# Patient Record
(demographics unavailable — no encounter records)

---

## 2025-03-12 NOTE — COUNSELING
[Cessation strategies including cessation program discussed] : Cessation strategies including cessation program discussed [Use of nicotine replacement therapies and other medications discussed] : Use of nicotine replacement therapies and other medications discussed [No] : Not willing to quit smoking [ - Annual Lung Cancer Screening/Share Decision Making Discussion] : Annual Lung Cancer Screening/Share Decision Making Discussion. (I have advised this patient to have a Low Dose CT (LDCT) scan of the lungs and have discussed the following with the patient in a shared decision making discussion:   Benefits of Detection and Early Treatment: There is adequate evidence that annual screening for lung cancer with LDCT in a population of high-risk persons can prevent a substantial number of lung cancer-related deaths. The magnitude of benefit depends on the individual patient's risk for lung cancer, as those who are at highest risk are most likely to benefit. Screening cannot prevent most lung cancer-related deaths, and does not replace smoking cessation. Harms of Detection and Early Intervention and Treatment: The harms associated with LDCT screening include false-negative and false-positive results, incidental findings, over diagnosis, and radiation exposure. False-positive LDCT results occur in a substantial proportion of screened persons; 95% of all positive results do not lead to a diagnosis of cancer. In a high-quality screening program, further imaging can resolve most false-positive results; however, some patients may require invasive procedures. Radiation harms, including cancer resulting from cumulative exposure to radiation, vary depending on the age at the start of screening; the number of scans received; and the person's exposure to other sources of radiation, particularly other medical imaging.) [FreeTextEntry1] : 5

## 2025-03-12 NOTE — REVIEW OF SYSTEMS
[Negative] : Neurological [Dysuria] : no dysuria [FreeTextEntry9] : As HPI [de-identified] : As HPI [de-identified] : Schizoaffective disorder

## 2025-03-12 NOTE — HISTORY OF PRESENT ILLNESS
[FreeTextEntry1] : Annual wellness exam. [de-identified] : Mr. SALINA MATHEW presents today for annual wellness exam.  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 GERD symptoms are controlled with pantoprazole he is asking refill. Type 2 diabetes follows endocrinology blood sugar is well-controlled. He continues to smoke. Follows with cardiology denied any chest pain, shortness of breath.

## 2025-03-12 NOTE — ASSESSMENT
[FreeTextEntry1] : Annual: Ordered comprehensive blood work  screen for  thyroid disorder. HIV and Hep C  screening test.deferred labs drawn in the office The results will be reviewed, and any abnormalities will be addressed accordingly.  flu vaccine:deferred tdap: utd PPSV  :utd shingles vaccine.: deferred COVID vaccine:utd   alcohol screening :SIBRT:0 Depression screening:PHQ2:0  cirrhosis/MCDONOUGH  EGD  showed esophageal varices and portal hypertension He is on Lasix and Aldactone and nadolol 40 mg twice a day He was referred to gastroenterologist/hepatologist, have not made appointment yet.  Order CMP. Advised patient to follow Mediterranean diet, portion control, exercise 150 minutes/week  CHF/atrial fibrillation Sees cardiology, reviewed notes He is currently on Lasix, Aldactone, nadolol, atorvastatin, aspirin, sotalol, and Eliquis  DM: last HgA1c 5.7 8/2021. Ordered A1c On Mounjaro 2.5 mg, glipizide extended release 5 mg once a day and metformin 1000 mg twice a day Advised low-carb diet and avoid sugar. DM neuropathy: he is on  lyrica, gabapentin, and Nucynta ER sees neurologist Dr. Kidd   Schizoaffective disorder: Under the care of psychiatry and has been following up regularly He is on risperdal and lamictal  Chronic venous stasis ulcer/cellulitis of left leg Follows with general surgery Dr. Sharif  He will continue furosemide. Recommended elevation of leg increase mobility.  GERD Symptoms are well-controlled on pantoprazole 40 mg.  Renewed prescription.  COPD/smoker: symptoms are well controlled on spiriva. He continues to smoke , counselled on cessation. Nicotine dependence: I SPEND 5 MIN ON COUNSELLING ON SMOKING CESSATION. Explained the harmful effects of smoking on various aspects of health, including the increased risk of lung cancer, heart disease, respiratory issues, and other smoking-related illnesses. Behavioral therapies: Suggested counseling or behavioral therapy programs that can complement their efforts to quit smoking. Such programs can provide support, coping strategies, and motivation to stay on track. Ordered lung cancer screening  obesity: BMI:39 Advised low carb , Mediterranean diet,avoid carbonated beverage , added sugar and sweets. exercise min 150 min/wk. portion control, maintain a food diary.   Palpitations Paroxysmal atrial fibrillation.  Sees cardiology. he is on Sotalol and Eliquis   HFpEF (EF 60-65% 05/21) He is currently asymptomatic from cardiac standpoint. On aspirin, statin, spironolactone and Lasix.

## 2025-03-12 NOTE — PHYSICAL EXAM
[Normal] : soft, non-tender, non-distended, no masses palpated, no HSM and normal bowel sounds [Normal Insight/Judgement] : insight and judgment were intact [de-identified] : Very poor hygiene, foul-smelling [de-identified] : Left lower leg is wrapped with bandage the skin looks red swollen feet is with white crust

## 2025-03-12 NOTE — HEALTH RISK ASSESSMENT
[Fair] : ~his/her~ current health as fair  [Good] : ~his/her~  mood as  good [No] : No [Little interest or pleasure doing things] : 1) Little interest or pleasure doing things [Feeling down, depressed, or hopeless] : 2) Feeling down, depressed, or hopeless [0] : 2) Feeling down, depressed, or hopeless: Not at all (0) [PHQ-2 Negative - No further assessment needed] : PHQ-2 Negative - No further assessment needed [Current] : Current [NO] : No [Patient declined colonoscopy] : Patient declined colonoscopy [HIV test declined] : HIV test declined [Hepatitis C test declined] : Hepatitis C test declined [None] : None [Smoke Detector] : smoke detector [Seat Belt] :  uses seat belt [Patient/Caregiver not ready to engage] : , patient/caregiver not ready to engage [Audit-CScore] : 0 [PIU4Occld] : 0 [Change in mental status noted] : No change in mental status noted [AdvancecareDate] : 3/25

## 2025-03-12 NOTE — HEALTH RISK ASSESSMENT
[Fair] : ~his/her~ current health as fair  [Good] : ~his/her~  mood as  good [No] : No [Little interest or pleasure doing things] : 1) Little interest or pleasure doing things [Feeling down, depressed, or hopeless] : 2) Feeling down, depressed, or hopeless [0] : 2) Feeling down, depressed, or hopeless: Not at all (0) [PHQ-2 Negative - No further assessment needed] : PHQ-2 Negative - No further assessment needed [Current] : Current [NO] : No [Patient declined colonoscopy] : Patient declined colonoscopy [HIV test declined] : HIV test declined [Hepatitis C test declined] : Hepatitis C test declined [None] : None [Smoke Detector] : smoke detector [Seat Belt] :  uses seat belt [Patient/Caregiver not ready to engage] : , patient/caregiver not ready to engage [Audit-CScore] : 0 [RMQ5Xcmyl] : 0 [Change in mental status noted] : No change in mental status noted [AdvancecareDate] : 3/25

## 2025-03-12 NOTE — HISTORY OF PRESENT ILLNESS
[FreeTextEntry1] : Annual wellness exam. [de-identified] : Mr. SALINA MATHEW presents today for annual wellness exam.  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 GERD symptoms are controlled with pantoprazole he is asking refill. Type 2 diabetes follows endocrinology blood sugar is well-controlled. He continues to smoke. Follows with cardiology denied any chest pain, shortness of breath.

## 2025-03-12 NOTE — REVIEW OF SYSTEMS
[Negative] : Neurological [Dysuria] : no dysuria [FreeTextEntry9] : As HPI [de-identified] : As HPI [de-identified] : Schizoaffective disorder

## 2025-03-12 NOTE — PHYSICAL EXAM
[Normal] : soft, non-tender, non-distended, no masses palpated, no HSM and normal bowel sounds [Normal Insight/Judgement] : insight and judgment were intact [de-identified] : Very poor hygiene, foul-smelling [de-identified] : Left lower leg is wrapped with bandage the skin looks red swollen feet is with white crust

## 2025-03-21 NOTE — HISTORY OF PRESENT ILLNESS
[Home] : at home, [unfilled] , at the time of the visit. [Telehealth (audio & video)] : This visit was provided via telehealth using real-time 2-way audio visual technology. [Verbal consent obtained from patient] : the patient, [unfilled] [FreeTextEntry1] : Follow up of DM  History of DM: Diagnosed for around 15 years  Severity:  Home regimen: Mounjaro 2.5 weekly  Glipizide 5 mg daily    Previous medications Glyburide  Metformin 1g BID   Sugar checks: once a week Hypoglycemia: has symptoms, once every 6 months  Eye doctor: Due  Neuropathy: has neuropathy Kidney disease: none Has liver cirrhosis due to fatty liver but not following with anyone   This visit:  Has no erections and retrograde ejaculation    Smoking: A pack of cigarettes  Exercise: cannot walk because of the balance    Labs: A1c 6   All other ROS reviewed, and they are negative.   Labs reviewed.

## 2025-03-21 NOTE — ASSESSMENT
[FreeTextEntry1] : Type 2 DM controlled  A1c 6.5 Off Metformin  Counseled on risk for MTC, pancreatitis, worsening gall bladder stones and worsening DR Medication to stop 2 weeks before planned surgery.  We agreed on the following plan today. Continue Glipizide 5 mg daily  Increase Mounjaro to 5 mg weekly, once you increase if sugars drop down, stop the glipizide  Continue on healthy diet. Please see an eye doctor Please see a foot doctor for the neuropathy      HLD On statin Check LDL    HTN On spironolactone and nadolol     Peripheral Neuropathy  Following with neurologist   Elevated PRL  Due psychotropic medications (Resperidone) Recheck pituitary labs Labs 8 am fasting   High Free T4 Will check esoterix FT4 first then will recheck FT4 with dialysis as needed   Low testosterone  GEMA  Obesity  Extensively counseled on weight loss strategies and exercise at least 30 minutes daily Will recheck numbers   Smoke counseling  Cut one cig every 2 weeks    I spent 30 minutes discussing with patient face to face and non face to face reviewing documentations, labs, and/or imaging, also discussing the management plans.   RTC in 3 months for 30 min as [employer]

## 2025-07-09 NOTE — REASON FOR VISIT
[Home] : at home, [unfilled] , at the time of the visit. [Medical Office: ([facility])___] : at the medical office located in  [Telehealth (audio & video)] : This visit was provided via telehealth using real-time 2-way audio visual technology. [Verbal consent obtained from patient] : the patient, [unfilled]

## 2025-07-09 NOTE — ASSESSMENT
[FreeTextEntry1] : Type 2 DM controlled  A1c 6.8 Off Metformin  Stop Glipizide  Counseled on risk for MTC, pancreatitis, worsening gall bladder stones and worsening DR Medication to stop 2 weeks before planned surgery.  We agreed on the following plan today. Stop Glipizide 5 mg daily  Increase Mounjaro to 7.5 mg weekly, Continue on healthy diet. Please see an eye doctor Please see a foot doctor for the neuropathy      HLD On statin Check LDL    HTN On spironolactone and nadolol   Peripheral Neuropathy  Following with neurologist   Elevated PRL  Due psychotropic medications (Resperidone) PRL is 19  Recheck pituitary labs Labs 8 am fasting   High Free T4 Will check esoterix FT4 first then will recheck FT4 with dialysis as needed   Low testosterone  GEMA  Obesity  Discussed weight loss medication and ruling out sleep apnea before we start testosterone replacement  Will recheck numbers   Smoke counseling  Cut one cig every 2 weeks    I spent 30 minutes discussing with patient face to face and non face to face reviewing documentations, labs, and/or imaging, also discussing the management plans.   RTC in 5 months for 30 min as tele health, difficulty ambulating

## 2025-07-09 NOTE — PHYSICAL EXAM
[#6 Diminished] : number 6 was diminished [#7 Diminished] : number 7 was diminished [#8 Diminished] : number 8 was diminished [#9 Diminished] : number 9 was diminished [#10 Diminished] : number 10 was diminished [#1 Diminished] : number 1 was diminished [#2 Diminished] : number 2 was diminished [#3 Diminished] : number 3 was diminished [#4 Diminished] : number 4 was diminished [#5 Diminished] : number 5 was diminished [FreeTextEntry4] : Complete loss of sensation, dry skin, onchomcyosis  [FreeTextEntry8] : Complete loss of sensation, dry skin, onchomcyosis

## 2025-07-09 NOTE — HISTORY OF PRESENT ILLNESS
[Home] : at home, [unfilled] , at the time of the visit. [Medical Office: ([facility])___] : at the medical office located in  [Telephone (audio)] : This telephonic visit was provided via audio only technology. [Technical] : patient unable to effectively utilize tele-video due to technical issues. [Verbal consent obtained from patient] : the patient, [unfilled] [FreeTextEntry1] : Follow up of DM  History of DM: Diagnosed for around 15 years  Severity: controlled  Home regimen: Mounjaro 5 weekly  Glipizide 5 mg daily    Previous medications Glyburide  Metformin 1g BID   Sugar checks: once a week Hypoglycemia: has symptoms, once every 6 months  Eye doctor: Due  Neuropathy: has neuropathy Kidney disease: none Has liver cirrhosis due to fatty liver but not following with anyone   This visit:  Has no erections and retrograde ejaculation    Smoking: A pack of cigarettes  Exercise: cannot walk because of the balance    Labs: A1c 6   All other ROS reviewed, and they are negative.   Labs reviewed.      4/2025 ACR normal Testosterone low, could be due to GEMA, will repeat next time TSH and FT slightly high LDL good goal HCG normal PRL mildly elevated at 33.1, but monomeric prl mild elevated at 19.5 which is not a major concern, likely due to psych meds FSH and LH normal Na mildly low, will monitor Estradiol normal A1c 6.8, good control Progesterone, IGF, SHBG normal